# Patient Record
(demographics unavailable — no encounter records)

---

## 2025-04-21 NOTE — HISTORY OF PRESENT ILLNESS
[de-identified] : Compliant with chol med.  No nausea, vomiting, diarrhea, and constipation. No chest pain or shortness of breath.  Still under stress from passing of  3/9/2024. Migraine HA's managed with a cold freezer ramachandran.

## 2025-04-21 NOTE — HEALTH RISK ASSESSMENT
[Very Good] : ~his/her~  mood as very good [0] : 2) Feeling down, depressed, or hopeless: Not at all (0) [PHQ-2 Negative - No further assessment needed] : PHQ-2 Negative - No further assessment needed [Never] : Never [Patient reported mammogram was normal] : Patient reported mammogram was normal [Alone] : lives alone [Employed] : employed [] :  [[id number]] : 0 [MammogramDate] : 09/24